# Patient Record
Sex: FEMALE | Race: OTHER | Employment: UNEMPLOYED | URBAN - METROPOLITAN AREA
[De-identification: names, ages, dates, MRNs, and addresses within clinical notes are randomized per-mention and may not be internally consistent; named-entity substitution may affect disease eponyms.]

---

## 2017-12-17 ENCOUNTER — APPOINTMENT (OUTPATIENT)
Dept: GENERAL RADIOLOGY | Age: 37
End: 2017-12-17
Payer: COMMERCIAL

## 2017-12-17 ENCOUNTER — APPOINTMENT (OUTPATIENT)
Dept: CT IMAGING | Age: 37
End: 2017-12-17
Payer: COMMERCIAL

## 2017-12-17 ENCOUNTER — HOSPITAL ENCOUNTER (EMERGENCY)
Age: 37
Discharge: HOME | End: 2017-12-17
Payer: COMMERCIAL

## 2017-12-17 VITALS
WEIGHT: 145 LBS | HEIGHT: 64 IN | TEMPERATURE: 97.9 F | BODY MASS INDEX: 24.75 KG/M2 | HEART RATE: 101 BPM | OXYGEN SATURATION: 100 % | SYSTOLIC BLOOD PRESSURE: 136 MMHG | DIASTOLIC BLOOD PRESSURE: 89 MMHG | RESPIRATION RATE: 16 BRPM
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
## 2017-12-17 DIAGNOSIS — S09.90XA CLOSED HEAD INJURY, INITIAL ENCOUNTER: Primary | ICD-10-CM

## 2017-12-17 DIAGNOSIS — S02.2XXA CLOSED FRACTURE OF NASAL BONE, INITIAL ENCOUNTER: ICD-10-CM

## 2017-12-17 PROCEDURE — 70486 CT MAXILLOFACIAL W/O DYE: CPT

## 2017-12-17 PROCEDURE — 6370000000 HC RX 637 (ALT 250 FOR IP): Performed by: PHYSICIAN ASSISTANT

## 2017-12-17 PROCEDURE — 70450 CT HEAD/BRAIN W/O DYE: CPT

## 2017-12-17 PROCEDURE — 73130 X-RAY EXAM OF HAND: CPT

## 2017-12-17 PROCEDURE — 99284 EMERGENCY DEPT VISIT MOD MDM: CPT

## 2017-12-17 PROCEDURE — 72050 X-RAY EXAM NECK SPINE 4/5VWS: CPT

## 2017-12-17 PROCEDURE — 73060 X-RAY EXAM OF HUMERUS: CPT

## 2017-12-17 RX ORDER — AMOXICILLIN AND CLAVULANATE POTASSIUM 875; 125 MG/1; MG/1
1 TABLET, FILM COATED ORAL EVERY 12 HOURS SCHEDULED
Status: DISCONTINUED | OUTPATIENT
Start: 2017-12-17 | End: 2017-12-17 | Stop reason: HOSPADM

## 2017-12-17 RX ORDER — TRAMADOL HYDROCHLORIDE 50 MG/1
50 TABLET ORAL EVERY 6 HOURS PRN
Qty: 8 TABLET | Refills: 0 | Status: SHIPPED | OUTPATIENT
Start: 2017-12-17

## 2017-12-17 RX ORDER — AMOXICILLIN AND CLAVULANATE POTASSIUM 875; 125 MG/1; MG/1
1 TABLET, FILM COATED ORAL 2 TIMES DAILY
Qty: 14 TABLET | Refills: 0 | Status: SHIPPED | OUTPATIENT
Start: 2017-12-17 | End: 2017-12-24

## 2017-12-17 RX ORDER — IBUPROFEN 800 MG/1
800 TABLET ORAL ONCE
Status: COMPLETED | OUTPATIENT
Start: 2017-12-17 | End: 2017-12-17

## 2017-12-17 RX ADMIN — AMOXICILLIN AND CLAVULANATE POTASSIUM 1 TABLET: 875; 125 TABLET, FILM COATED ORAL at 13:24

## 2017-12-17 RX ADMIN — IBUPROFEN 800 MG: 800 TABLET, FILM COATED ORAL at 13:17

## 2017-12-17 ASSESSMENT — ENCOUNTER SYMPTOMS
SHORTNESS OF BREATH: 0
ABDOMINAL DISTENTION: 0
CONSTIPATION: 0
WHEEZING: 0
COUGH: 0
CHOKING: 0
EYE DISCHARGE: 0
STRIDOR: 0
COLOR CHANGE: 0
RHINORRHEA: 0
SORE THROAT: 0
BACK PAIN: 0
ABDOMINAL PAIN: 0

## 2017-12-17 ASSESSMENT — PAIN DESCRIPTION - PAIN TYPE
TYPE: ACUTE PAIN
TYPE: ACUTE PAIN

## 2017-12-17 ASSESSMENT — PAIN SCALES - GENERAL
PAINLEVEL_OUTOF10: 10
PAINLEVEL_OUTOF10: 8

## 2017-12-17 ASSESSMENT — PAIN DESCRIPTION - LOCATION
LOCATION: FACE
LOCATION: HEAD;ARM

## 2017-12-17 ASSESSMENT — PAIN DESCRIPTION - ORIENTATION
ORIENTATION: LEFT
ORIENTATION: RIGHT

## 2017-12-17 ASSESSMENT — PAIN DESCRIPTION - ONSET: ONSET: SUDDEN

## 2017-12-17 ASSESSMENT — PAIN DESCRIPTION - FREQUENCY
FREQUENCY: CONTINUOUS
FREQUENCY: CONTINUOUS
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
## 2023-10-01 ENCOUNTER — HOSPITAL ENCOUNTER (EMERGENCY)
Facility: HOSPITAL | Age: 43
Discharge: HOME | End: 2023-10-01
Payer: COMMERCIAL

## 2023-10-01 VITALS
HEIGHT: 64 IN | RESPIRATION RATE: 16 BRPM | HEART RATE: 99 BPM | SYSTOLIC BLOOD PRESSURE: 105 MMHG | BODY MASS INDEX: 23.9 KG/M2 | WEIGHT: 140 LBS | DIASTOLIC BLOOD PRESSURE: 69 MMHG | TEMPERATURE: 96.8 F | OXYGEN SATURATION: 99 %

## 2023-10-01 DIAGNOSIS — Z20.2 ENCOUNTER FOR ASSESSMENT OF STD EXPOSURE: Primary | ICD-10-CM

## 2023-10-01 DIAGNOSIS — N89.8 VAGINAL ITCHING: ICD-10-CM

## 2023-10-01 PROCEDURE — 99284 EMERGENCY DEPT VISIT MOD MDM: CPT

## 2023-10-01 PROCEDURE — 87800 DETECT AGNT MULT DNA DIREC: CPT | Mod: CMCLAB,ELYLAB | Performed by: PHYSICIAN ASSISTANT

## 2023-10-01 PROCEDURE — 2500000004 HC RX 250 GENERAL PHARMACY W/ HCPCS (ALT 636 FOR OP/ED): Performed by: PHYSICIAN ASSISTANT

## 2023-10-01 PROCEDURE — A4217 STERILE WATER/SALINE, 500 ML: HCPCS

## 2023-10-01 PROCEDURE — 2500000004 HC RX 250 GENERAL PHARMACY W/ HCPCS (ALT 636 FOR OP/ED)

## 2023-10-01 PROCEDURE — 99283 EMERGENCY DEPT VISIT LOW MDM: CPT

## 2023-10-01 RX ORDER — METRONIDAZOLE 250 MG/1
500 TABLET ORAL ONCE
Status: DISCONTINUED | OUTPATIENT
Start: 2023-10-01 | End: 2023-10-01 | Stop reason: HOSPADM

## 2023-10-01 RX ORDER — CEFTRIAXONE 500 MG/1
500 INJECTION, POWDER, FOR SOLUTION INTRAMUSCULAR; INTRAVENOUS ONCE
Status: COMPLETED | OUTPATIENT
Start: 2023-10-01 | End: 2023-10-01

## 2023-10-01 RX ORDER — WATER 1000 ML/1000ML
INJECTION, SOLUTION INTRAVENOUS
Status: COMPLETED
Start: 2023-10-01 | End: 2023-10-01

## 2023-10-01 RX ORDER — DOXYCYCLINE HYCLATE 100 MG
100 TABLET ORAL ONCE
Status: DISCONTINUED | OUTPATIENT
Start: 2023-10-01 | End: 2023-10-01 | Stop reason: HOSPADM

## 2023-10-01 RX ORDER — FLUCONAZOLE 200 MG/1
200 TABLET ORAL ONCE
Qty: 1 TABLET | Refills: 0 | Status: SHIPPED | OUTPATIENT
Start: 2023-10-01 | End: 2023-10-01

## 2023-10-01 RX ADMIN — CEFTRIAXONE SODIUM 500 MG: 500 INJECTION, POWDER, FOR SOLUTION INTRAMUSCULAR; INTRAVENOUS at 06:30

## 2023-10-01 RX ADMIN — WATER 1 ML: 1 INJECTION INTRAMUSCULAR; INTRAVENOUS; SUBCUTANEOUS at 07:43

## 2023-10-01 ASSESSMENT — COLUMBIA-SUICIDE SEVERITY RATING SCALE - C-SSRS
1. IN THE PAST MONTH, HAVE YOU WISHED YOU WERE DEAD OR WISHED YOU COULD GO TO SLEEP AND NOT WAKE UP?: NO
6. HAVE YOU EVER DONE ANYTHING, STARTED TO DO ANYTHING, OR PREPARED TO DO ANYTHING TO END YOUR LIFE?: NO
2. HAVE YOU ACTUALLY HAD ANY THOUGHTS OF KILLING YOURSELF?: NO

## 2023-10-01 ASSESSMENT — PAIN - FUNCTIONAL ASSESSMENT
PAIN_FUNCTIONAL_ASSESSMENT: 0-10
PAIN_FUNCTIONAL_ASSESSMENT: 0-10

## 2023-10-01 NOTE — ED PROVIDER NOTES
HPI   Chief Complaint   Patient presents with    Vaginitis/Bacterial Vaginosis     itching       A 42-year-old female patient comes in the emergency department today with concern for yeast infection and common STDs.  States she has multiple sexual partners.  She describes vaginal itching.  Denies any fevers, chills, nausea, vomiting, diarrhea.  She otherwise has no other complaints this present time.  For this purpose she comes in the emergency department today for further evaluation.                          No data recorded                Patient History   History reviewed. No pertinent past medical history.  History reviewed. No pertinent surgical history.  No family history on file.  Social History     Tobacco Use    Smoking status: Never    Smokeless tobacco: Never   Substance Use Topics    Alcohol use: Not on file    Drug use: Not on file       Physical Exam   ED Triage Vitals [10/01/23 0454]   Temp Heart Rate Resp BP   36 °C (96.8 °F) 99 16 105/69      SpO2 Temp Source Heart Rate Source Patient Position   99 % Temporal -- Sitting      BP Location FiO2 (%)     Right arm --       Physical Exam  Vitals and nursing note reviewed.   Constitutional:       General: She is not in acute distress.     Appearance: Normal appearance. She is well-developed and normal weight.   HENT:      Head: Normocephalic and atraumatic.      Nose: Nose normal.   Eyes:      Extraocular Movements: Extraocular movements intact.      Conjunctiva/sclera: Conjunctivae normal.   Cardiovascular:      Rate and Rhythm: Normal rate and regular rhythm.      Heart sounds: No murmur heard.  Pulmonary:      Effort: Pulmonary effort is normal. No respiratory distress.      Breath sounds: Normal breath sounds.   Abdominal:      Palpations: Abdomen is soft.      Tenderness: There is no abdominal tenderness.   Musculoskeletal:         General: No swelling.      Cervical back: Neck supple.   Skin:     General: Skin is warm and dry.      Capillary Refill:  Capillary refill takes less than 2 seconds.   Neurological:      Mental Status: She is alert.   Psychiatric:         Mood and Affect: Mood normal.         ED Course & MDM   Diagnoses as of 10/01/23 0743   Encounter for assessment of STD exposure   Vaginal itching       Medical Decision Making  A 42-year-old female patient comes in the emergency department today with concern for yeast infection and common STDs.  States she has multiple sexual partners.  She describes vaginal itching.  Denies any fevers, chills, nausea, vomiting, diarrhea.  She otherwise has no other complaints this present time.  For this purpose she comes in the emergency department today for further evaluation.    Patient tested for common STDs in the emergency department today including gonorrhea, chlamydia, trichomonas as well as for bacterial vaginosis.  Patient prophylactically treated with IM Rocephin, doxycycline, Flagyl.    Patient is unsure if she is pregnant and unable to provide a urine sample to check for pregnancy.  I discussed this with the nurse we will just give IM Rocephin at this time let her laboratory studies go to culture and will call in prescriptions if she comes back positive for STDs.    Historian is the patient    Diagnosis: Encounter for STD, vaginal itching            Procedure  Procedures     Bryson Alvarado PA-C  10/01/23 0708       Bryson Alvarado PA-C  10/01/23 0743

## 2023-10-01 NOTE — Clinical Note
Lou Brink was seen and treated in our emergency department on 10/1/2023.  She may return to work on 10/02/2023.       If you have any questions or concerns, please don't hesitate to call.      Bryson Alvarado PA-C

## 2023-10-02 LAB
C TRACH RRNA SPEC QL NAA+PROBE: NEGATIVE
N GONORRHOEA DNA SPEC QL PROBE+SIG AMP: NEGATIVE